# Patient Record
Sex: FEMALE | Race: OTHER | NOT HISPANIC OR LATINO | Employment: FULL TIME | URBAN - METROPOLITAN AREA
[De-identification: names, ages, dates, MRNs, and addresses within clinical notes are randomized per-mention and may not be internally consistent; named-entity substitution may affect disease eponyms.]

---
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
## 2024-04-24 ENCOUNTER — PATIENT MESSAGE (OUTPATIENT)
Dept: OBSTETRICS AND GYNECOLOGY | Facility: CLINIC | Age: 19
End: 2024-04-24
Payer: COMMERCIAL

## 2024-04-30 ENCOUNTER — OFFICE VISIT (OUTPATIENT)
Dept: OBSTETRICS AND GYNECOLOGY | Facility: CLINIC | Age: 19
End: 2024-04-30
Payer: COMMERCIAL

## 2024-04-30 ENCOUNTER — LAB VISIT (OUTPATIENT)
Dept: LAB | Facility: HOSPITAL | Age: 19
End: 2024-04-30
Attending: GENERAL PRACTICE
Payer: COMMERCIAL

## 2024-04-30 VITALS
RESPIRATION RATE: 17 BRPM | DIASTOLIC BLOOD PRESSURE: 62 MMHG | HEIGHT: 62 IN | WEIGHT: 117.75 LBS | BODY MASS INDEX: 21.67 KG/M2 | SYSTOLIC BLOOD PRESSURE: 112 MMHG

## 2024-04-30 DIAGNOSIS — Z11.3 SCREEN FOR STD (SEXUALLY TRANSMITTED DISEASE): Primary | ICD-10-CM

## 2024-04-30 DIAGNOSIS — Z11.3 SCREEN FOR STD (SEXUALLY TRANSMITTED DISEASE): ICD-10-CM

## 2024-04-30 PROCEDURE — 36415 COLL VENOUS BLD VENIPUNCTURE: CPT | Performed by: GENERAL PRACTICE

## 2024-04-30 PROCEDURE — 81514 NFCT DS BV&VAGINITIS DNA ALG: CPT | Performed by: GENERAL PRACTICE

## 2024-04-30 PROCEDURE — 99213 OFFICE O/P EST LOW 20 MIN: CPT | Mod: S$GLB,,, | Performed by: GENERAL PRACTICE

## 2024-04-30 PROCEDURE — 80074 ACUTE HEPATITIS PANEL: CPT | Performed by: GENERAL PRACTICE

## 2024-04-30 PROCEDURE — 86593 SYPHILIS TEST NON-TREP QUANT: CPT | Performed by: GENERAL PRACTICE

## 2024-04-30 PROCEDURE — 99999 PR PBB SHADOW E&M-EST. PATIENT-LVL III: CPT | Mod: PBBFAC,,, | Performed by: GENERAL PRACTICE

## 2024-04-30 PROCEDURE — 87491 CHLMYD TRACH DNA AMP PROBE: CPT | Performed by: GENERAL PRACTICE

## 2024-04-30 PROCEDURE — 87389 HIV-1 AG W/HIV-1&-2 AB AG IA: CPT | Performed by: GENERAL PRACTICE

## 2024-04-30 NOTE — PATIENT INSTRUCTIONS
Have a question or concern?  for an emergency  call 911 or go to the nearest hospital [facility] Nurse Care Advice Line  [phone]  you can speak to a nurse 24-7   for non-urgent issues, send us a  message in [employer] for non-urgent issues, call the clinic  [phone], Option 3   Consider calling the Nurse Care Advice Line if it's a weekend or toward the end of the work-day since [employer] and phone messages may not be answered right away.       STD AND VAGINITIS TESTING:  If you are enrolled in [employer], I will trust that when you see a negative result, you understand that means you do not have the particular STD we were checking for.  You will not get a message from me.    If something comes back positive, one of my staff members or I will call you to let you know about the result and what the recommended treatment is.  For most STD's, it is VERY important that you do not have sex until both you and your partner(s) have completed treatment for the STD.  I cannot prescribe medications for your partner(s).    Bacterial Vaginitis (BV) and vaginal yeast infections (Candida, for example) are not STD's.    Trichomonas is an STD.    If you are prescribed an antibiotic, it is very important that you take all of the medicine as prescribed.  Incomplete treatment can cause a relapse and/or antibiotic resistance.

## 2024-04-30 NOTE — PROGRESS NOTES
SUBJECTIVE  04/30/2024    Antionette Menon is a 18 y.o. here requesting STD screening and checking for a yeast infection.    For the past couple of months feels as though she gets a yeast infection, takes OTC treatment and feels better, but then it comes back.  Primary symptom is itching.  No abnormal discharge.  No odor.  Had stopped taking YASMANI because she worried that might be part of the problem.    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    My full previous note is pasted below.    OBJECTIVE   Vitals:    04/30/24 1127   BP: 112/62   Resp: 17       GEN = alert/oriented, nad, pleasant  HEENT = sclera anicteric, EOM grossly normal   =      External: nefg, no lesions     Vagina: normal and without lesions and urethral meatus normal     Discharge: normal and physiologic, vaginitis swab obtained     Cervix: CT/NG swab obtained and cervix has a smooth ridge of tissue across the top from ~10-2 o'clock     Bimanual: uterus normal size and nontender, no adnexal masses or tenderness      ASSESSMENT / PLAN   18 y.o. requesting STD screen and vaginitis testing.  Normal exam.    f/u results of vaginitis swab, CT/NG, HIV, HEP, TPA Abs    first PAP age 20    Discussed a common side effect of YASMANI is vaginal dryness.  Encouraged use of lubricant if needed.  She plans to resume taking YASMANI for contraception.    RTC lupe WARD MD    -------------------------  6/12/23:    HISTORY OF THE PRESENT ILLNESS    Antionette is a 17 y.o., using Condoms for contraception, here for contraceptive counseling and STD screening.  Her mother is here with her and is supportive.  LMP 17 MAY 2023    -------------------------------------------------------------------------------------------------------    PAST MEDICAL HISTORY  None    PAST SURGICAL HISTORY  GI scope: for reflux, 10th grade    MEDICATIONS  None    ALLERGIES  NKDA    OBSTETRIC HISTORY  G0    GYNECOLOGIC HISTORY  PAP'S: not previously tested  STI'S: no past  "history  GENITAL HSV: no  DYSMENORRHEA: managed well with ibuprofen prn  DYSPAREUNIA: No  PELVIC PRESSURE OR PAIN: No  MENSES:  Menarche: 12 yoa Duration: 5 days   # Heavy Days: 3 How often pad/tampon ? on heavy days: 4 per day   Intermenstrual bleeding: No Frequency: regular every 28-30 days     SOCIAL HISTORY  Smoker: non-smoker  Alcohol: never  Drugs: never  Relationship: one boyfriend x 3 months, one prior sexual partner  Domestic Violence: no  Lives with: mom  Education Level: High School  Occupation: [employer] in [employer]    FAMILY HISTORY  HTN: none  DIABETES: none  BLEEDING DISORDERS: none  CLOTTING DISORDERS: none  GYN CANCER: none    --------------------------------------------------------------------------------------------------------------    PHYSICAL EXAM  Visit Vitals  /60 (BP Location: Right arm, Patient Position: Sitting, BP Method: Medium (Manual))   Resp 16   Ht 5' 2" (1.575 m)   Wt 61.1 kg (134 lb 11.2 oz)   LMP 05/17/2023 (Exact Date)   BMI 24.64 kg/m²        GEN = alert/oriented, nad  HEENT = sclera anicteric, EOM grossly normal      ASSESSMENT AND PLAN:  16yo healthy G0, condoms for contraception, here for contraceptive counseling and STD screening.    -All methods of contraception discussed in detail today, along with their relative pros/cons and side effects.  Discussed barrier methods, OCP's, vaginal rings, skin patches, DepoProvera, Nexplanon, progesterone-containing IUD's, and copper IUD.  Written information provided to the patient, and demonstration performed with models.  The patient would like to start with a combination OCP.  Rx placed for Alesse.  Instructed on Sunday start.  Use condoms reliably until then.    -Discussed implications of becoming sexually active with the patient.  Encouraged her to minimize the number of lifetime partners she has.  Advised just one sexual encounter can result in STD or pregnancy.  Discussed potential immediate and " long-term consequences of STD's: discomfort / pain, social stigma, chronic illness, infertility, emotional effects, HPV related cancers.  Discussed life-changing impact of pregnancy, whether it results in SAB, ETP, or live birth.  Stressed that no contraceptive method is 100% effective.  Stressed that condoms are the only way to prevent STD's.    -Gardasil already received.    -to lab for urine CT/GC and serum: HIV, Hepatitis, RPR    -RTC as needed; may send message in [employer] if would like to switch to a patch    MD ED

## 2024-05-01 DIAGNOSIS — B37.31 VAGINAL CANDIDIASIS: Primary | ICD-10-CM

## 2024-05-01 LAB
BACTERIAL VAGINOSIS DNA: NEGATIVE
C TRACH DNA SPEC QL NAA+PROBE: NOT DETECTED
CANDIDA GLABRATA DNA: NEGATIVE
CANDIDA KRUSEI DNA: NEGATIVE
CANDIDA RRNA VAG QL PROBE: POSITIVE
HAV IGM SERPL QL IA: NEGATIVE
HBV CORE IGM SERPL QL IA: NEGATIVE
HBV SURFACE AG SERPL QL IA: NEGATIVE
HCV IGG SERPL QL IA: NEGATIVE
HIV 1+2 AB+HIV1 P24 AG SERPL QL IA: NEGATIVE
N GONORRHOEA DNA SPEC QL NAA+PROBE: NOT DETECTED
T VAGINALIS RRNA GENITAL QL PROBE: NEGATIVE
TREPONEMA PALLIDUM IGG+IGM AB [PRESENCE] IN SERUM OR PLASMA BY IMMUNOASSAY: NONREACTIVE

## 2024-05-01 RX ORDER — FLUCONAZOLE 150 MG/1
150 TABLET ORAL ONCE
Qty: 2 TABLET | Refills: 0 | Status: SHIPPED | OUTPATIENT
Start: 2024-05-01 | End: 2024-05-01

## 2024-05-01 NOTE — PROGRESS NOTES
Antionette,    Your test shows you have a vaginal yeast infection.  I've ordered a medication called Diflucan for you to take.  It's a pill that you'll take one time.  If 3 days later you're still having symptoms (itching, discharge, burning), then you should take the 2nd pill.  If you don't need the 2nd pill you can save it for use another time or discard it.    The script went to the [facility] in [place].  Let me know if you need it to go somewhere else.    Sincerely,  Dr. Draper

## 2024-05-23 ENCOUNTER — PATIENT MESSAGE (OUTPATIENT)
Dept: OBSTETRICS AND GYNECOLOGY | Facility: CLINIC | Age: 19
End: 2024-05-23
Payer: COMMERCIAL

## 2024-05-23 DIAGNOSIS — B37.31 VAGINAL CANDIDIASIS: Primary | ICD-10-CM

## 2024-05-23 RX ORDER — LEVONORGESTREL AND ETHINYL ESTRADIOL 0.1-0.02MG
1 KIT ORAL
Qty: 84 TABLET | Refills: 2 | OUTPATIENT
Start: 2024-05-23

## 2024-05-23 RX ORDER — ASPIRIN 325 MG
1 TABLET, DELAYED RELEASE (ENTERIC COATED) ORAL NIGHTLY
Qty: 45 G | Refills: 0 | Status: SHIPPED | OUTPATIENT
Start: 2024-05-23

## 2024-06-26 DIAGNOSIS — N94.6 MENSTRUAL CRAMPS: ICD-10-CM

## 2024-06-27 RX ORDER — IBUPROFEN 600 MG/1
600 TABLET ORAL 2 TIMES DAILY PRN
Qty: 30 TABLET | Refills: 0 | Status: SHIPPED | OUTPATIENT
Start: 2024-06-27

## 2024-07-10 ENCOUNTER — TELEPHONE (OUTPATIENT)
Dept: FAMILY MEDICINE | Facility: CLINIC | Age: 19
End: 2024-07-10
Payer: COMMERCIAL

## 2024-12-17 ENCOUNTER — OFFICE VISIT (OUTPATIENT)
Dept: OBSTETRICS AND GYNECOLOGY | Facility: CLINIC | Age: 19
End: 2024-12-17
Payer: COMMERCIAL

## 2024-12-17 VITALS
OXYGEN SATURATION: 99 % | HEART RATE: 69 BPM | HEIGHT: 62 IN | SYSTOLIC BLOOD PRESSURE: 110 MMHG | DIASTOLIC BLOOD PRESSURE: 68 MMHG | WEIGHT: 122.13 LBS | BODY MASS INDEX: 22.48 KG/M2

## 2024-12-17 DIAGNOSIS — Z01.419 WELL WOMAN EXAM WITH ROUTINE GYNECOLOGICAL EXAM: Primary | ICD-10-CM

## 2024-12-17 DIAGNOSIS — B37.31 VAGINAL YEAST INFECTION: ICD-10-CM

## 2024-12-17 DIAGNOSIS — Z11.3 SCREEN FOR STD (SEXUALLY TRANSMITTED DISEASE): ICD-10-CM

## 2024-12-17 PROCEDURE — 0352U VAGINOSIS SCREEN BY DNA PROBE: CPT | Performed by: FAMILY MEDICINE

## 2024-12-17 PROCEDURE — 87491 CHLMYD TRACH DNA AMP PROBE: CPT | Performed by: FAMILY MEDICINE

## 2024-12-17 PROCEDURE — 99999 PR PBB SHADOW E&M-EST. PATIENT-LVL III: CPT | Mod: PBBFAC,,, | Performed by: FAMILY MEDICINE

## 2024-12-17 PROCEDURE — 99395 PREV VISIT EST AGE 18-39: CPT | Mod: S$GLB,,, | Performed by: FAMILY MEDICINE

## 2024-12-17 RX ORDER — FLUCONAZOLE 150 MG/1
150 TABLET ORAL
Qty: 3 TABLET | Refills: 0 | Status: SHIPPED | OUTPATIENT
Start: 2024-12-17

## 2024-12-17 RX ORDER — LEVONORGESTREL/ETHIN.ESTRADIOL 0.1-0.02MG
1 TABLET ORAL DAILY
Qty: 90 TABLET | Refills: 3 | Status: CANCELLED | OUTPATIENT
Start: 2024-12-17

## 2024-12-17 NOTE — PROGRESS NOTES
"  HISTORY OF THE PRESENT ILLNESS    12/17/2024  Antionette Menon is a 19 y.o. here for Annual Exam  Reports doing well and requesting STD screening today.   Does report thick white vaginal discharge that is recurrent.     G'sP's: No obstetric history on file.  LMP: Patient's last menstrual period was 11/25/2024 (exact date).  Relationship: boyfriend for almost 2 years  Contraception: condoms    PAP'S: not previously tested    HPV Vaccine: complete     LABS & RADS   Lab Results   Component Value Date    WBC 4.97 07/12/2023    HGB 10.4 (L) 07/12/2023    HCT 35.1 (L) 07/12/2023     07/12/2023    MCV 75 (L) 07/12/2023      Lab Results   Component Value Date    TSH 1.273 07/12/2023      No results found for: "LABURIN"  Lab Results   Component Value Date    HEPCAB Negative 04/30/2024    TREPABIGMIGG Nonreactive 04/30/2024    HEPBSAG Negative 04/30/2024    HIH60DGRH Negative 04/30/2024     Lab Results   Component Value Date    LABCHLA Not Detected 04/30/2024    LABNGO Not Detected 04/30/2024        GYNECOLOGIC HISTORY    Genital HSV: no  STD Hx: no past history    Menarche:    Period duration: 4 days  # Heavy Days: 2  Pad/tampon ? on heavy days: couple hours  Intermenstrual bleeding: No  Period frequency:regular every 28-30 days    OBSTETRIC HISTORY  OB History   No obstetric history on file.       PAST MEDICAL HISTORY  -------------------------------------    H. pylori infection         PAST SURGICAL HISTORY  ----------------------------    Egd - external result         ALLERGIES  Review of patient's allergies indicates:  No Known Allergies    MEDICATIONS  Current Outpatient Medications   Medication Instructions    azelastine (ASTELIN) 137 mcg, Nasal, 2 times daily    clotrimazole (LOTRIMIN) 1 % Crea 1 applicator, Vaginal, Nightly    ibuprofen (ADVIL,MOTRIN) 600 mg, Oral, 2 times daily PRN       SOCIAL HISTORY  Social History     Tobacco Use    Smoking status: Never    Smokeless tobacco: Never   Substance Use Topics " "   Alcohol use: Never    Drug use: Never      Lives with: mom  Occupation:  surgical tech school      FAMILY HISTORY  BLEEDING or  CLOTTING DISORDERS: none  BREAST CA: none  UTERINE CA: none  OVARIAN CA: none  COLON CA: none    REVIEW OF SYSTEMS  Review of Systems   Constitutional:  Negative for activity change, appetite change and fever.   Respiratory:  Negative for cough and shortness of breath.    Genitourinary:  Negative for dysuria, flank pain, frequency, pelvic pain, urgency and urinary incontinence.   Psychiatric/Behavioral:  Negative for depression.      --------------------------------------------------------------------------------------------------------------    PHYSICAL EXAM  VITALS:  Vitals:    12/17/24 1015   BP: 110/68   BP Location: Left arm   Patient Position: Sitting   Pulse: 69   SpO2: 99%   Weight: 55.4 kg (122 lb 2.2 oz)   Height: 5' 2" (1.575 m)     Exam conducted with a chaperone present.     Physical Exam:   Constitutional: She is oriented to person, place, and time. She appears well-developed and well-nourished.    HENT:   Head: Normocephalic.    Eyes: Pupils are equal, round, and reactive to light. Conjunctivae and EOM are normal.      Pulmonary/Chest: Effort normal.          Genitourinary:    Inguinal canal, vagina, right adnexa, left adnexa and rectum normal.      Pelvic exam was performed with patient supine.   The external female genitalia was normal.   Genitalia hair distrobution normal .   Vagina was moist.          Musculoskeletal: Normal range of motion and moves all extremeties.       Neurological: She is alert and oriented to person, place, and time.    Skin: Skin is warm and dry.    Psychiatric: She has a normal mood and affect.          ASSESSMENT AND PLAN:  Antionette Menon 19 y.o.   Antionette was seen today for annual exam.    Diagnoses and all orders for this visit:    Well woman exam with routine gynecological exam    Screen for STD (sexually transmitted disease)  -     " Vaginosis Screen by DNA Probe  -     C. trachomatis/N. gonorrhoeae by AMP DNA [facility]; Cervix    Vaginal yeast infection  -     fluconazole (DIFLUCAN) 150 MG Tab; Take 1 tablet (150 mg total) by mouth every 72 hours as needed (vaginal yeast).      Cervical Cancer screening: first PAP age 20  HPV Vaccine: complete    Patient was counseled today on :  Pap guidelines  Recommend periodic pelvic exams with visual inspection and palpation  mammograms starting annually at age 40  Encouraged self breast awareness; RTC for breast concerns  Colonoscopy after the age of 50  Dexa Bone Scan and calcium and vitamin D supplementation in menopause and to see her PCP for other health maintenance.     RTC for periodic GYN exam, sooner prn      Korin T Griffing     Follow up if symptoms worsen or fail to improve.   No future appointments.      All of your core healthy metrics are met.

## 2024-12-18 DIAGNOSIS — N94.6 MENSTRUAL CRAMPS: ICD-10-CM

## 2024-12-18 RX ORDER — IBUPROFEN 600 MG/1
600 TABLET ORAL 2 TIMES DAILY PRN
Qty: 30 TABLET | Refills: 0 | OUTPATIENT
Start: 2024-12-18

## 2024-12-20 DIAGNOSIS — B96.89 BV (BACTERIAL VAGINOSIS): Primary | ICD-10-CM

## 2024-12-20 DIAGNOSIS — N76.0 BV (BACTERIAL VAGINOSIS): Primary | ICD-10-CM

## 2024-12-20 LAB
BACTERIAL VAGINOSIS DNA: DETECTED
C TRACH DNA SPEC QL NAA+PROBE: NOT DETECTED
CANDIDA GLABRATA/KRUSEI: NOT DETECTED
CANDIDA RRNA VAG QL PROBE: DETECTED
N GONORRHOEA DNA SPEC QL NAA+PROBE: NOT DETECTED
TRICHOMONAS VAGINALIS: NOT DETECTED

## 2024-12-20 RX ORDER — METRONIDAZOLE 7.5 MG/G
1 GEL VAGINAL NIGHTLY
Qty: 70 G | Refills: 0 | Status: SHIPPED | OUTPATIENT
Start: 2024-12-20 | End: 2024-12-25

## 2024-12-20 NOTE — PROGRESS NOTES
your test shows you do have bacterial vaginitis (BV) as well as the yeast infection.  I have ordered metrogel (metrodiniazole) vaginal gel for you.   this is not an STD but you should refrain from having sex until treatment is completed.

## 2025-01-24 ENCOUNTER — PATIENT MESSAGE (OUTPATIENT)
Dept: OBSTETRICS AND GYNECOLOGY | Facility: CLINIC | Age: 20
End: 2025-01-24
Payer: COMMERCIAL

## 2025-01-24 RX ORDER — METRONIDAZOLE 7.5 MG/G
1 GEL VAGINAL
Qty: 70 G | Refills: 0 | Status: SHIPPED | OUTPATIENT
Start: 2025-01-24

## 2025-02-26 ENCOUNTER — OFFICE VISIT (OUTPATIENT)
Dept: FAMILY MEDICINE | Facility: CLINIC | Age: 20
End: 2025-02-26
Payer: COMMERCIAL

## 2025-02-26 ENCOUNTER — LAB VISIT (OUTPATIENT)
Dept: LAB | Facility: HOSPITAL | Age: 20
End: 2025-02-26
Attending: NURSE PRACTITIONER
Payer: COMMERCIAL

## 2025-02-26 VITALS
WEIGHT: 120.94 LBS | HEART RATE: 68 BPM | OXYGEN SATURATION: 100 % | DIASTOLIC BLOOD PRESSURE: 66 MMHG | BODY MASS INDEX: 22.12 KG/M2 | SYSTOLIC BLOOD PRESSURE: 112 MMHG

## 2025-02-26 DIAGNOSIS — L65.9 HAIR THINNING: ICD-10-CM

## 2025-02-26 DIAGNOSIS — Z00.00 ANNUAL PHYSICAL EXAM: ICD-10-CM

## 2025-02-26 DIAGNOSIS — Z00.00 ANNUAL PHYSICAL EXAM: Primary | ICD-10-CM

## 2025-02-26 LAB
25(OH)D3+25(OH)D2 SERPL-MCNC: 8 NG/ML (ref 30–96)
ALBUMIN SERPL BCP-MCNC: 4 G/DL (ref 3.5–5.2)
ALP SERPL-CCNC: 77 U/L (ref 40–150)
ALT SERPL W/O P-5'-P-CCNC: 9 U/L (ref 10–44)
ANION GAP SERPL CALC-SCNC: 7 MMOL/L (ref 8–16)
AST SERPL-CCNC: 26 U/L (ref 10–40)
BASOPHILS # BLD AUTO: 0.03 K/UL (ref 0–0.2)
BASOPHILS NFR BLD: 0.7 % (ref 0–1.9)
BILIRUB SERPL-MCNC: 0.5 MG/DL (ref 0.1–1)
BUN SERPL-MCNC: 9 MG/DL (ref 6–20)
CALCIUM SERPL-MCNC: 9.1 MG/DL (ref 8.7–10.5)
CHLORIDE SERPL-SCNC: 108 MMOL/L (ref 95–110)
CO2 SERPL-SCNC: 25 MMOL/L (ref 23–29)
CREAT SERPL-MCNC: 0.8 MG/DL (ref 0.5–1.4)
DIFFERENTIAL METHOD BLD: ABNORMAL
EOSINOPHIL # BLD AUTO: 0.1 K/UL (ref 0–0.5)
EOSINOPHIL NFR BLD: 2.7 % (ref 0–8)
ERYTHROCYTE [DISTWIDTH] IN BLOOD BY AUTOMATED COUNT: 15 % (ref 11.5–14.5)
EST. GFR  (NO RACE VARIABLE): >60 ML/MIN/1.73 M^2
GLUCOSE SERPL-MCNC: 87 MG/DL (ref 70–110)
HCT VFR BLD AUTO: 38.8 % (ref 37–48.5)
HGB BLD-MCNC: 12.2 G/DL (ref 12–16)
IMM GRANULOCYTES # BLD AUTO: 0.01 K/UL (ref 0–0.04)
IMM GRANULOCYTES NFR BLD AUTO: 0.2 % (ref 0–0.5)
LYMPHOCYTES # BLD AUTO: 2 K/UL (ref 1–4.8)
LYMPHOCYTES NFR BLD: 44.8 % (ref 18–48)
MCH RBC QN AUTO: 26.5 PG (ref 27–31)
MCHC RBC AUTO-ENTMCNC: 31.4 G/DL (ref 32–36)
MCV RBC AUTO: 84 FL (ref 82–98)
MONOCYTES # BLD AUTO: 0.3 K/UL (ref 0.3–1)
MONOCYTES NFR BLD: 7.6 % (ref 4–15)
NEUTROPHILS # BLD AUTO: 2 K/UL (ref 1.8–7.7)
NEUTROPHILS NFR BLD: 44 % (ref 38–73)
NRBC BLD-RTO: 0 /100 WBC
PLATELET # BLD AUTO: 354 K/UL (ref 150–450)
PMV BLD AUTO: 10.8 FL (ref 9.2–12.9)
POTASSIUM SERPL-SCNC: 4.4 MMOL/L (ref 3.5–5.1)
PROT SERPL-MCNC: 7.3 G/DL (ref 6–8.4)
RBC # BLD AUTO: 4.61 M/UL (ref 4–5.4)
SODIUM SERPL-SCNC: 140 MMOL/L (ref 136–145)
TSH SERPL DL<=0.005 MIU/L-ACNC: 3.42 UIU/ML (ref 0.4–4)
WBC # BLD AUTO: 4.49 K/UL (ref 3.9–12.7)

## 2025-02-26 PROCEDURE — 99395 PREV VISIT EST AGE 18-39: CPT | Mod: S$GLB,,, | Performed by: NURSE PRACTITIONER

## 2025-02-26 PROCEDURE — 99999 PR PBB SHADOW E&M-EST. PATIENT-LVL III: CPT | Mod: PBBFAC,,, | Performed by: NURSE PRACTITIONER

## 2025-02-26 PROCEDURE — 36415 COLL VENOUS BLD VENIPUNCTURE: CPT | Mod: PO | Performed by: NURSE PRACTITIONER

## 2025-02-26 PROCEDURE — 85025 COMPLETE CBC W/AUTO DIFF WBC: CPT | Performed by: NURSE PRACTITIONER

## 2025-02-26 PROCEDURE — 84443 ASSAY THYROID STIM HORMONE: CPT | Performed by: NURSE PRACTITIONER

## 2025-02-26 PROCEDURE — 80053 COMPREHEN METABOLIC PANEL: CPT | Performed by: NURSE PRACTITIONER

## 2025-02-26 PROCEDURE — 82306 VITAMIN D 25 HYDROXY: CPT | Performed by: NURSE PRACTITIONER

## 2025-02-26 NOTE — PROGRESS NOTES
Patient ID: Antionette Menon is a 19 y.o. female.    Chief Complaint: Annual Exam  Last seen in primary care by me on 07/12/2023  She is accompanied by her boyfriend    History of Present Illness    CHIEF COMPLAINT:  Patient presents today for annual evaluation with multiple concerns including headaches.    HEADACHES:  She reports headaches that begin mild in the morning and progressively worsen throughout the day. The location varies and is described as a tight sensation rather than pounding, with associated nausea. Current episode has persisted for three days, which is longer than her typical duration. She denies any neurological changes in sight, ability to manage across the day. States it does not effect her daily activities    DIET AND HYDRATION:  She reports frequent consumption of fast food, particularly daily [employer]. She acknowledges poor water intake with minimal daily consumption. She experiences bloating after eating.    GI CONCERNS:  She reports recent constipation and difficulty swallowing, which she attributes to accidentally swallowing larger pieces of food due to her braces.    HAIR LOSS:  She reports significant hair loss with increased shedding during washing and combing, despite having long hair.    REPRODUCTIVE HEALTH:  She has regular 28-day menstrual cycles and recently completed her period. She is sexually active and uses condoms consistently for contraception. She reports a previous negative experience with birth control.         Physical Exam    General: No acute distress. Well-developed. Well-nourished.  Eyes: EOMI. Sclerae anicteric.  HENT: Normocephalic. Atraumatic. Nares patent. Moist oral mucosa.  Ears: Bilateral TMs clear. Bilateral EACs clear.  Cardiovascular: Regular rate. Regular rhythm. No murmurs. No rubs. No gallops. Normal S1, S2. Normal heart sounds.  Respiratory: Normal respiratory effort. Clear to auscultation bilaterally. No rales. No rhonchi. No wheezing.  Abdomen: Soft.  Non-tender. Non-distended. Normoactive bowel sounds.  Musculoskeletal: No  obvious deformity.  Extremities: No lower extremity edema.  Neurological: Alert & oriented x3. No slurred speech. Normal gait.  Psychiatric: Normal mood. Normal affect. Good insight. Good judgment.  Skin: Warm. Dry. No rash.         Assessment & Plan    IMPRESSION:  - Evaluated reported headaches, considering potential causes such as menstrual cycle, hairstyle, and dehydration  - Assessed for anemia and thyroid function due to reported hair loss  - Considered vitamin D deficiency given patient's ethnicity  - Evaluated GI symptoms, including bloating and constipation, likely related to poor diet and eating habits    HEADACHES:  - Advised the patient to use a headache tracking john to monitor frequency and potential triggers.  - Recommend loosening or removing hair ties at night to reduce tension headaches.  - Noted that the patient experiences mild headaches in the morning that worsen throughout the day, lasting for 3 days.  - Evaluated the headache characteristics, including location, duration, and associated symptoms.  - Assessed potential triggers for the headaches, including hairstyle and stress levels.  - Suggested increasing water intake as a potential remedy for the headaches.  - Instructed the patient to report if headaches persist or worsen despite implemented changes.    DYSPHAGIA:  - Advised the patient to chew food thoroughly to aid digestion and prevent choking.  - Noted that the patient reports feeling like they are choking on food, especially when swallowing larger pieces due to braces.  - Recommend reducing water intake while eating and to eat more slowly while chewing more during meals and avoiding sodas.  - Instructed the patient to follow up if dysphagia symptoms persist after implementing dietary changes.    GASTROINTESTINAL ISSUES:  - Noted that the patient reports experiencing constipation and feeling bloated after  eating.  - Evaluated the patient's diet, noting frequent consumption of fast food and junk food.  - Performed a physical exam and noted good bowel sounds with no pain.  - Discussed the importance of a balanced diet and the risks associated with frequent consumption of processed and fast foods.  - Recommend reducing fast food consumption to 3 days a week and increasing intake of fruits, vegetables, and fiber-rich foods.  - Instructed the patient to follow up if GI symptoms persist after implementing dietary changes.    HAIR LOSS:  - Noted that the patient reports experiencing hair loss and shedding, particularly when combing or washing hair.  - Acknowledged that some hair loss is normal but planned to examine the patient's hair more closely.  - Planned to check thyroid function and vitamin D levels as potential causes of hair loss but explained often it can also be hair products and she carefully chose items that are less toxic    DIETARY COUNSELING:  - Emphasized the importance of proper nutrition and its impact on overall health, particularly regarding colon cancer risk.  - Discussed the benefits of eating a variety of fruits and vegetables for improved bowel function.  - Recommend reducing eating out to 3 days per week.  - Advised incorporating daily salads and fruits into diet.  - Encouraged consuming a variety of colorful fruits and vegetables.  - Recommend limiting soda consumption, including diet sodas.  - Noted that the patient reports eating a lot of junk food and fast food, particularly [employer], [employer], and [employer] almost daily.  - Evaluated the patient's diet and noted the frequent consumption of fast food and processed foods.  - Discussed the risks associated with frequent consumption of processed and fast foods, including increased risk of colon cancer.  - Advised increasing intake of fruits and vegetables and incorporating more home-cooked meals.    PREVENTIVE CARE:  - Educated on various birth  control options, including condoms, hormonal methods, and emergency contraception. Attached handout to after visit summary for birth control options.  - Explained the potential risks of smoking, excessive alcohol consumption, and illicit drug use.  - Discussed the importance of seat belt use for safety.  - Increase water intake.  - Maintain non-smoking status.    LABS:  - Annual labs ordered including complete blood count, comprehensive metabolic panel, and thyroid function tests.  - Vitamin D level test ordered.          Medication List with Changes/Refills   Current Medications    AZELASTINE (ASTELIN) 137 MCG (0.1 %) NASAL SPRAY    1 spray (137 mcg total) by Nasal route 2 (two) times daily.   Discontinued Medications    CLOTRIMAZOLE (LOTRIMIN) 1 % CREA    Place 1 suppository (1 applicator total) vaginally every evening.    FLUCONAZOLE (DIFLUCAN) 150 MG TAB    Take 1 tablet (150 mg total) by mouth every 72 hours as needed (vaginal yeast).    IBUPROFEN (ADVIL,MOTRIN) 600 MG TABLET    Take 1 tablet (600 mg total) by mouth 2 (two) times daily as needed for Pain.    METRONIDAZOLE (METROGEL) 0.75 % (37.5MG/5 GRAM) VAGINAL GEL    PLACE 1 APPLICATOR VAGINALLY EVERY EVENING. FOR 5 DAYS        No follow-ups on file.    This note was generated with the assistance of ambient listening technology. Verbal consent was obtained by the patient and accompanying visitor(s) for the recording of patient appointment to facilitate this note. I attest to having reviewed and edited the generated note for accuracy, though some syntax or spelling errors may persist. Please contact the author of this note for any clarification.

## 2025-04-04 ENCOUNTER — RESULTS FOLLOW-UP (OUTPATIENT)
Dept: FAMILY MEDICINE | Facility: CLINIC | Age: 20
End: 2025-04-04

## 2025-04-04 DIAGNOSIS — E55.9 VITAMIN D DEFICIENCY: Primary | ICD-10-CM

## 2025-04-04 RX ORDER — ERGOCALCIFEROL 1.25 MG/1
50000 CAPSULE ORAL
Qty: 12 CAPSULE | Refills: 3 | Status: SHIPPED | OUTPATIENT
Start: 2025-04-04

## 2025-06-02 ENCOUNTER — OFFICE VISIT (OUTPATIENT)
Dept: PRIMARY CARE CLINIC | Facility: CLINIC | Age: 20
End: 2025-06-02
Payer: COMMERCIAL

## 2025-06-02 DIAGNOSIS — K21.9 GASTRIC REFLUX: Primary | ICD-10-CM

## 2025-06-02 PROCEDURE — 98004 SYNCH AUDIO-VIDEO EST SF 10: CPT | Mod: 95,,, | Performed by: NURSE PRACTITIONER

## 2025-06-02 RX ORDER — PANTOPRAZOLE SODIUM 40 MG/1
40 TABLET, DELAYED RELEASE ORAL DAILY
Qty: 30 TABLET | Refills: 1 | Status: SHIPPED | OUTPATIENT
Start: 2025-06-02 | End: 2026-06-02

## 2025-06-02 NOTE — PROGRESS NOTES
"The patient location is: [place]  The chief complaint leading to consultation is: chronic abdominal discomfort  History H-pylori  States had upper GI a few years ago  I last saw her on 02/26/2025 for annual  "Food sometimes feel is in throat, states feel like a bunch of mucous"  "Burping a thousand times daily"  BM every few days but ocassionally may go once every 4 days  Started Depo shot May 2025    Visit type: audiovisual    Face to Face time with patient: 10:03-10:15  12 minutes of total time spent on the encounter, which includes face to face time and non-face to face time preparing to see the patient (eg, review of tests), Obtaining and/or reviewing separately obtained history, Documenting clinical information in the electronic or other health record, Independently interpreting results (not separately reported) and communicating results to the patient/family/caregiver, or Care coordination (not separately reported).         Each patient to whom he or she provides medical services by telemedicine is:  (1) informed of the relationship between the physician and patient and the respective role of any other health care provider with respect to management of the patient; and (2) notified that he or she may decline to receive medical services by telemedicine and may withdraw from such care at any time.    Notes:   No blood in stool  No nausea or vomiting  Advised on need for in person assessment with myself or another provider with nature of her complaint  Discussed on need to go directly to ED of pain worsens, inability to eat or severe nausea and vomiting before in person visit    Medication List with Changes/Refills   New Medications    PANTOPRAZOLE (PROTONIX) 40 MG TABLET    Take 1 tablet (40 mg total) by mouth once daily.   Current Medications    ERGOCALCIFEROL (ERGOCALCIFEROL) 50,000 UNIT CAP    Take 1 capsule (50,000 Units total) by mouth every 7 days.   Discontinued Medications    AZELASTINE (ASTELIN) 137 " MCG (0.1 %) NASAL SPRAY    1 spray (137 mcg total) by Nasal route 2 (two) times daily.                          Answers submitted by the patient for this visit:  Abdominal Pain Questionnaire (Submitted on 6/2/2025)  Chief Complaint: Abdominal pain  Chronicity: recurrent  Onset: more than 1 year ago  Onset quality: gradual  Frequency: intermittently  Progression since onset: waxing and waning  Pain location: epigastric region  Pain - numeric: 5/10  Pain quality: aching, cramping, sharp  anorexia: No  arthralgias: No  belching: Yes  constipation: Yes  diarrhea: No  dysuria: No  fever: No  flatus: Yes  frequency: No  headaches: No  hematochezia: No  hematuria: No  melena: No  myalgias: No  nausea: No  weight loss: No  vomiting: No  Aggravated by: drinking alcohol  Relieved by: certain positions  Pain severity: mild  Treatments tried: antibiotics  Improvement on treatment: significant  abdominal surgery: No  colon cancer: No  Crohn's disease: No  gallstones: No  GERD: Yes  irritable bowel syndrome: No  kidney stones: No  pancreatitis: No  PUD: Yes  ulcerative colitis: No  UTI: No

## 2025-06-10 ENCOUNTER — OFFICE VISIT (OUTPATIENT)
Dept: GASTROENTEROLOGY | Facility: CLINIC | Age: 20
End: 2025-06-10
Payer: COMMERCIAL

## 2025-06-10 ENCOUNTER — OFFICE VISIT (OUTPATIENT)
Dept: FAMILY MEDICINE | Facility: CLINIC | Age: 20
End: 2025-06-10
Payer: COMMERCIAL

## 2025-06-10 VITALS
BODY MASS INDEX: 22.58 KG/M2 | WEIGHT: 123.44 LBS | HEART RATE: 84 BPM | DIASTOLIC BLOOD PRESSURE: 64 MMHG | SYSTOLIC BLOOD PRESSURE: 110 MMHG | OXYGEN SATURATION: 99 % | RESPIRATION RATE: 18 BRPM

## 2025-06-10 VITALS — WEIGHT: 123.44 LBS | HEIGHT: 62 IN | BODY MASS INDEX: 22.71 KG/M2

## 2025-06-10 DIAGNOSIS — R10.9 INTERMITTENT ABDOMINAL PAIN: ICD-10-CM

## 2025-06-10 DIAGNOSIS — R14.2 BURPING: Primary | ICD-10-CM

## 2025-06-10 DIAGNOSIS — Z86.19 HISTORY OF HELICOBACTER PYLORI INFECTION: ICD-10-CM

## 2025-06-10 DIAGNOSIS — R10.13 EPIGASTRIC PAIN: Primary | ICD-10-CM

## 2025-06-10 DIAGNOSIS — R14.2 BELCHING: ICD-10-CM

## 2025-06-10 DIAGNOSIS — K21.9 GASTROESOPHAGEAL REFLUX DISEASE WITHOUT ESOPHAGITIS: ICD-10-CM

## 2025-06-10 PROCEDURE — 99214 OFFICE O/P EST MOD 30 MIN: CPT | Mod: S$GLB,,, | Performed by: STUDENT IN AN ORGANIZED HEALTH CARE EDUCATION/TRAINING PROGRAM

## 2025-06-10 PROCEDURE — 99999 PR PBB SHADOW E&M-EST. PATIENT-LVL III: CPT | Mod: PBBFAC,,, | Performed by: STUDENT IN AN ORGANIZED HEALTH CARE EDUCATION/TRAINING PROGRAM

## 2025-06-10 PROCEDURE — 99999 PR PBB SHADOW E&M-EST. PATIENT-LVL III: CPT | Mod: PBBFAC,,, | Performed by: NURSE PRACTITIONER

## 2025-06-10 PROCEDURE — 99204 OFFICE O/P NEW MOD 45 MIN: CPT | Mod: S$GLB,,, | Performed by: NURSE PRACTITIONER

## 2025-06-10 NOTE — PROGRESS NOTES
Problem visit/UC  Chief Complaint   Patient presents with    Abdominal Pain     Started 4 years ago off and on        Subjective   Patient ID: Antionette Menon is a 19 y.o. female.    HPI  Antionette Menon is a 19 y.o. who presents with abdominal pain.   Patient is being seen today for same day sick visit. Patient is new to provider. PCP is ROSE Henriquez.    The patient reports for the last 4yrs she has been having abdominal pain. She states at that time she was dx with H pylori vai EGD but she did not complete the treatment as prescribed. She reports for hte last 1.5yrs her symptoms have gotten worse again. She saw her PCP 1 week ago and she was prescirbed protonix which the patient states it has helped.      Review of Systems  Objective      Vitals:    06/10/25 1042   BP: 110/64   Patient Position: Sitting   Pulse: 84   Resp: 18   SpO2: 99%   Weight: 56 kg (123 lb 7.3 oz)       Physical Exam  Vitals reviewed.   Constitutional:       General: She is not in acute distress.     Appearance: Normal appearance. She is not ill-appearing, toxic-appearing or diaphoretic.   HENT:      Head: Normocephalic and atraumatic.   Pulmonary:      Effort: No respiratory distress.   Abdominal:      General: Bowel sounds are normal. There is no distension.      Palpations: Abdomen is soft. There is no mass.      Tenderness: There is no abdominal tenderness. There is no guarding or rebound.      Hernia: No hernia is present.   Neurological:      Mental Status: She is alert.         Procedures         Assessment & Plan   Burping  - The patient reports she has noticed increased burping and reflex for which she saw her PCP last week and was prescribed protonix. She states there has been improvement of her reflux but the burping has continued   -     Ambulatory referral/consult to Gastroenterology; Future; Expected date: 06/17/2025    Intermittent abdominal pain  - The patient states that she has had intermittent abdominal pain for the last 4yrs  but it has worsened in the last 1.5yrs. no acute findings on physical exam  -     Ambulatory referral/consult to Gastroenterology; Future; Expected date: 06/17/2025    History of Helicobacter pylori infection  - The patient reports previous dx with h pylori 4yrs ago by EGD but she did not finish her abx treatment when prescribed  - Unable to do breath h pylori test as patient started protonix prescribed by her PCP 1 week ago  -     Ambulatory referral/consult to Gastroenterology; Future; Expected date: 06/17/2025          Patient given return precautions and instructed to FU with PCP regarding symptoms.

## 2025-06-10 NOTE — PROGRESS NOTES
Subjective:       Patient ID: Antionette Menon is a 19 y.o. female, Body mass index is 22.58 kg/m².    Chief Complaint: Abdominal Pain      Patient is new to me. Referred by Dr. Irwin for burping, intermittent abdominal pain, and history of Helicobacter pylori infection.    Gastroesophageal Reflux  She complains of abdominal pain (epigastric region; intermittent), belching, heartburn and water brash. She reports no chest pain, no choking, no coughing, no dysphagia, no early satiety, no globus sensation, no hoarse voice, no nausea, no sore throat, no stridor, no tooth decay or no wheezing. Primary symptoms also include regurgitation of food. This is a recurrent problem. The current episode started more than 1 month ago (Recurred in 1/2025). The problem occurs frequently. The problem has been gradually improving (since starting Protonix). The heartburn is located in the substernum and abdomen. The heartburn is of moderate intensity. The heartburn does not wake her from sleep. The heartburn does not limit her activity. The heartburn doesn't change with position. The symptoms are aggravated by ETOH. Pertinent negatives include no anemia, melena or weight loss. Risk factors include ETOH use (Occ.). She has tried a PPI (Currently: Protonix 40 mg once daily- helps) for the symptoms. Past procedures include an EGD.     Review of Systems   Constitutional:  Negative for appetite change, fever, unexpected weight change and weight loss.   HENT:  Negative for hoarse voice, sore throat and trouble swallowing.    Respiratory:  Negative for cough, choking, shortness of breath and wheezing.    Cardiovascular:  Negative for chest pain.   Gastrointestinal:  Positive for abdominal pain (epigastric region; intermittent) and heartburn. Negative for abdominal distention, anal bleeding, blood in stool, constipation, diarrhea, dysphagia, melena, nausea, rectal pain and vomiting.   Genitourinary:  Negative for difficulty urinating and dysuria.    Musculoskeletal:  Negative for gait problem.   Skin:  Negative for rash.   Neurological:  Negative for speech difficulty.   Psychiatric/Behavioral:  Negative for confusion.        Past Medical History:   Diagnosis Date    H. pylori infection       Past Surgical History:   Procedure Laterality Date    EGD - EXTERNAL RESULT      UPPER GASTROINTESTINAL ENDOSCOPY        No family history on file.   Wt Readings from Last 10 Encounters:   06/10/25 56 kg (123 lb 7.3 oz)   06/10/25 56 kg (123 lb 7.3 oz)   02/26/25 54.9 kg (120 lb 14.8 oz)   12/17/24 55.4 kg (122 lb 2.2 oz)   04/30/24 53.4 kg (117 lb 11.6 oz) (30%, Z= -0.52)*   11/12/23 59.1 kg (130 lb 4.7 oz) (57%, Z= 0.17)*   07/12/23 58.9 kg (129 lb 13.6 oz) (58%, Z= 0.19)*   06/12/23 61.1 kg (134 lb 11.2 oz) (66%, Z= 0.41)*     * Growth percentiles are based on CDC (Girls, 2-20 Years) data.     Lab Results   Component Value Date    WBC 4.49 02/26/2025    HGB 12.2 02/26/2025    HCT 38.8 02/26/2025    MCV 84 02/26/2025     02/26/2025     CMP  Sodium   Date Value Ref Range Status   02/26/2025 140 136 - 145 mmol/L Final     Potassium   Date Value Ref Range Status   02/26/2025 4.4 3.5 - 5.1 mmol/L Final     Chloride   Date Value Ref Range Status   02/26/2025 108 95 - 110 mmol/L Final     CO2   Date Value Ref Range Status   02/26/2025 25 23 - 29 mmol/L Final     Glucose   Date Value Ref Range Status   02/26/2025 87 70 - 110 mg/dL Final     BUN   Date Value Ref Range Status   02/26/2025 9 6 - 20 mg/dL Final     Creatinine   Date Value Ref Range Status   02/26/2025 0.8 0.5 - 1.4 mg/dL Final     Calcium   Date Value Ref Range Status   02/26/2025 9.1 8.7 - 10.5 mg/dL Final     Total Protein   Date Value Ref Range Status   02/26/2025 7.3 6.0 - 8.4 g/dL Final     Albumin   Date Value Ref Range Status   02/26/2025 4.0 3.5 - 5.2 g/dL Final     Total Bilirubin   Date Value Ref Range Status   02/26/2025 0.5 0.1 - 1.0 mg/dL Final     Comment:     For infants and newborns,  interpretation of results should be based  on gestational age, weight and in agreement with clinical  observations.    Premature Infant recommended reference ranges:  Up to 24 hours.............<8.0 mg/dL  Up to 48 hours............<12.0 mg/dL  3-5 days..................<15.0 mg/dL  6-29 days.................<15.0 mg/dL       Alkaline Phosphatase   Date Value Ref Range Status   02/26/2025 77 40 - 150 U/L Final     AST   Date Value Ref Range Status   02/26/2025 26 10 - 40 U/L Final     ALT   Date Value Ref Range Status   02/26/2025 9 (L) 10 - 44 U/L Final     Anion Gap   Date Value Ref Range Status   02/26/2025 7 (L) 8 - 16 mmol/L Final              Reviewed prior medical records including endoscopy history (see surgical history).     Objective:      Physical Exam  Constitutional:       General: She is not in acute distress.     Appearance: She is well-developed.   HENT:      Head: Normocephalic.      Right Ear: Hearing normal.      Left Ear: Hearing normal.      Nose: Nose normal.      Mouth/Throat:      Mouth: No oral lesions.      Pharynx: Uvula midline.   Eyes:      General: Lids are normal.      Conjunctiva/sclera: Conjunctivae normal.      Pupils: Pupils are equal, round, and reactive to light.   Neck:      Trachea: Trachea normal.   Cardiovascular:      Rate and Rhythm: Normal rate and regular rhythm.      Heart sounds: Normal heart sounds. No murmur heard.  Pulmonary:      Effort: Pulmonary effort is normal. No respiratory distress.      Breath sounds: Normal breath sounds. No stridor. No wheezing.   Abdominal:      General: Bowel sounds are normal. There is no distension.      Palpations: Abdomen is soft. There is no mass.      Tenderness: There is no abdominal tenderness. There is no guarding or rebound.   Musculoskeletal:         General: Normal range of motion.      Cervical back: Normal range of motion.   Skin:     General: Skin is warm and dry.      Findings: No rash.      Comments: Non jaundiced    Neurological:      Mental Status: She is alert and oriented to person, place, and time.   Psychiatric:         Speech: Speech normal.         Behavior: Behavior normal. Behavior is cooperative.           Assessment:       1. Epigastric pain    2. Belching    3. Gastroesophageal reflux disease without esophagitis    4. History of Helicobacter pylori infection           Plan:   All diagnoses and orders for this visit:    Epigastric pain, Belching, Gastroesophageal reflux disease without esophagitis & History of Helicobacter pylori infection  - US Abdomen Complete; Future; Expected date: 06/10/2025  - Schedule EGD   - Continue Protonix 40 mg once daily  - Take PPI in the morning 30 minutes before breakfast  - Recommend to avoid large meals, avoid eating within 3 hours of bedtime, elevate head of bed if nocturnal symptoms are present, smoking cessation (if current smoker), & weight loss (if overweight).   - Recommend minimize/avoid high-fat foods, chocolate, caffeine, citrus, alcohol, & tomato products.  - Advised to avoid/limit use of NSAID's, since they can cause GI upset, bleeding, and/or ulcers. If needed, take with food.      If no improvement in symptoms or symptoms worsen, call/follow-up at clinic or go to ER

## 2025-06-17 ENCOUNTER — RESULTS FOLLOW-UP (OUTPATIENT)
Dept: GASTROENTEROLOGY | Facility: CLINIC | Age: 20
End: 2025-06-17

## 2025-06-17 ENCOUNTER — TELEPHONE (OUTPATIENT)
Dept: GASTROENTEROLOGY | Facility: CLINIC | Age: 20
End: 2025-06-17
Payer: COMMERCIAL

## 2025-06-17 ENCOUNTER — HOSPITAL ENCOUNTER (OUTPATIENT)
Dept: RADIOLOGY | Facility: HOSPITAL | Age: 20
Discharge: HOME | End: 2025-06-17
Attending: NURSE PRACTITIONER
Payer: COMMERCIAL

## 2025-06-17 DIAGNOSIS — R93.422 ABNORMAL ULTRASOUND OF LEFT KIDNEY: ICD-10-CM

## 2025-06-17 DIAGNOSIS — R93.5 ABNORMAL US (ULTRASOUND) OF ABDOMEN: Primary | ICD-10-CM

## 2025-06-17 DIAGNOSIS — R10.13 EPIGASTRIC PAIN: ICD-10-CM

## 2025-06-17 PROCEDURE — 76700 US EXAM ABDOM COMPLETE: CPT | Mod: 26,,, | Performed by: RADIOLOGY

## 2025-06-17 PROCEDURE — 76700 US EXAM ABDOM COMPLETE: CPT | Mod: TC,PO

## 2025-06-18 NOTE — TELEPHONE ENCOUNTER
Let patient know her US of abdomen showed a mildly prominent calyx of the left kidney; otherwise, unremarkable. Recommend a renal ultrasound to further evaluate left kidney.

## 2025-06-18 NOTE — TELEPHONE ENCOUNTER
Spoke with pt. Informed of results & recommendations per Inocencia Davis NP. Pt verbalized understanding. .  Scheduled US.   Pt verbalized understanding to all.

## 2025-06-24 ENCOUNTER — HOSPITAL ENCOUNTER (OUTPATIENT)
Dept: RADIOLOGY | Facility: HOSPITAL | Age: 20
Discharge: HOME | End: 2025-06-24
Attending: NURSE PRACTITIONER
Payer: COMMERCIAL

## 2025-06-24 ENCOUNTER — RESULTS FOLLOW-UP (OUTPATIENT)
Dept: GASTROENTEROLOGY | Facility: CLINIC | Age: 20
End: 2025-06-24

## 2025-06-24 DIAGNOSIS — R93.422 ABNORMAL ULTRASOUND OF LEFT KIDNEY: ICD-10-CM

## 2025-06-24 PROCEDURE — 76770 US EXAM ABDO BACK WALL COMP: CPT | Mod: TC,PO

## 2025-06-24 PROCEDURE — 76770 US EXAM ABDO BACK WALL COMP: CPT | Mod: 26,,, | Performed by: RADIOLOGY

## 2025-07-15 ENCOUNTER — ANESTHESIA (OUTPATIENT)
Dept: ENDOSCOPY | Facility: HOSPITAL | Age: 20
End: 2025-07-15
Payer: COMMERCIAL

## 2025-07-15 ENCOUNTER — ANESTHESIA EVENT (OUTPATIENT)
Dept: ENDOSCOPY | Facility: HOSPITAL | Age: 20
End: 2025-07-15
Payer: COMMERCIAL

## 2025-07-15 ENCOUNTER — HOSPITAL ENCOUNTER (OUTPATIENT)
Facility: HOSPITAL | Age: 20
Discharge: HOME | End: 2025-07-15
Attending: INTERNAL MEDICINE | Admitting: INTERNAL MEDICINE
Payer: COMMERCIAL

## 2025-07-15 DIAGNOSIS — K21.9 GASTROESOPHAGEAL REFLUX DISEASE, UNSPECIFIED WHETHER ESOPHAGITIS PRESENT: ICD-10-CM

## 2025-07-15 DIAGNOSIS — Z86.19 HISTORY OF HELICOBACTER PYLORI INFECTION: ICD-10-CM

## 2025-07-15 DIAGNOSIS — R10.13 EPIGASTRIC PAIN: ICD-10-CM

## 2025-07-15 LAB
B-HCG UR QL: NEGATIVE
CTP QC/QA: YES

## 2025-07-15 PROCEDURE — 88342 IMHCHEM/IMCYTCHM 1ST ANTB: CPT | Mod: 26,,, | Performed by: PATHOLOGY

## 2025-07-15 PROCEDURE — 37000008 HC ANESTHESIA 1ST 15 MINUTES: Mod: PO | Performed by: INTERNAL MEDICINE

## 2025-07-15 PROCEDURE — 88305 TISSUE EXAM BY PATHOLOGIST: CPT | Mod: 26,,, | Performed by: PATHOLOGY

## 2025-07-15 PROCEDURE — 43239 EGD BIOPSY SINGLE/MULTIPLE: CPT | Mod: ,,, | Performed by: INTERNAL MEDICINE

## 2025-07-15 PROCEDURE — 63600175 PHARM REV CODE 636 W HCPCS: Mod: PO | Performed by: NURSE ANESTHETIST, CERTIFIED REGISTERED

## 2025-07-15 PROCEDURE — 88305 TISSUE EXAM BY PATHOLOGIST: CPT | Mod: TC | Performed by: INTERNAL MEDICINE

## 2025-07-15 PROCEDURE — 27201012 HC FORCEPS, HOT/COLD, DISP: Mod: PO | Performed by: INTERNAL MEDICINE

## 2025-07-15 PROCEDURE — 43239 EGD BIOPSY SINGLE/MULTIPLE: CPT | Mod: PO | Performed by: INTERNAL MEDICINE

## 2025-07-15 PROCEDURE — 81025 URINE PREGNANCY TEST: CPT | Mod: PO | Performed by: INTERNAL MEDICINE

## 2025-07-15 PROCEDURE — 63600175 PHARM REV CODE 636 W HCPCS: Mod: PO | Performed by: INTERNAL MEDICINE

## 2025-07-15 RX ORDER — SODIUM CHLORIDE 0.9 % (FLUSH) 0.9 %
10 SYRINGE (ML) INJECTION
Status: DISCONTINUED | OUTPATIENT
Start: 2025-07-15 | End: 2025-07-15 | Stop reason: HOSPADM

## 2025-07-15 RX ORDER — PROPOFOL 10 MG/ML
VIAL (ML) INTRAVENOUS
Status: DISCONTINUED | OUTPATIENT
Start: 2025-07-15 | End: 2025-07-15

## 2025-07-15 RX ORDER — LIDOCAINE HYDROCHLORIDE 20 MG/ML
INJECTION INTRAVENOUS
Status: DISCONTINUED | OUTPATIENT
Start: 2025-07-15 | End: 2025-07-15

## 2025-07-15 RX ORDER — SODIUM CHLORIDE, SODIUM LACTATE, POTASSIUM CHLORIDE, CALCIUM CHLORIDE 600; 310; 30; 20 MG/100ML; MG/100ML; MG/100ML; MG/100ML
INJECTION, SOLUTION INTRAVENOUS CONTINUOUS
Status: DISCONTINUED | OUTPATIENT
Start: 2025-07-15 | End: 2025-07-15 | Stop reason: HOSPADM

## 2025-07-15 RX ADMIN — SODIUM CHLORIDE, SODIUM LACTATE, POTASSIUM CHLORIDE, AND CALCIUM CHLORIDE: .6; .31; .03; .02 INJECTION, SOLUTION INTRAVENOUS at 10:07

## 2025-07-15 RX ADMIN — PROPOFOL 30 MG: 10 INJECTION, EMULSION INTRAVENOUS at 10:07

## 2025-07-15 RX ADMIN — SODIUM CHLORIDE, POTASSIUM CHLORIDE, SODIUM LACTATE AND CALCIUM CHLORIDE: 600; 310; 30; 20 INJECTION, SOLUTION INTRAVENOUS at 09:07

## 2025-07-15 RX ADMIN — LIDOCAINE HYDROCHLORIDE 100 MG: 20 INJECTION INTRAVENOUS at 10:07

## 2025-07-15 RX ADMIN — PROPOFOL 100 MG: 10 INJECTION, EMULSION INTRAVENOUS at 10:07

## 2025-07-15 NOTE — DISCHARGE SUMMARY
[place] - Endoscopy  Discharge Note  Short Stay  Discharge Note  Short Stay      SUMMARY     Admit Date: 7/15/2025    Attending Physician: Vickey Malin MD     Discharge Physician: Vickey Malin MD    Discharge Date: 7/15/2025 10:42 AM    Final Diagnosis: Epigastric pain [R10.13]  Gastroesophageal reflux disease, unspecified whether esophagitis present [K21.9]  History of Helicobacter pylori infection [Z86.19]    Disposition: HOME OR SELF CARE    Patient Instructions:   Current Discharge Medication List        CONTINUE these medications which have NOT CHANGED    Details   ergocalciferol (ERGOCALCIFEROL) 50,000 unit Cap Take 1 capsule (50,000 Units total) by mouth every 7 days.  Qty: 12 capsule, Refills: 3    Associated Diagnoses: Vitamin D deficiency      pantoprazole (PROTONIX) 40 MG tablet Take 1 tablet (40 mg total) by mouth once daily.  Qty: 30 tablet, Refills: 1    Associated Diagnoses: Gastric reflux             Discharge Procedure Orders (must include Diet, Follow-up, Activity)    Follow Up:  Follow up with PCP as previously scheduled  Resume routine diet.  Activity as tolerated.    No driving day of procedure.

## 2025-07-15 NOTE — PROVATION PATIENT INSTRUCTIONS
Discharge Summary/Instructions after an Endoscopic Procedure  Patient Name: Antionette Menon  Patient MRN: [id number]  Patient YOB: 2005  Tuesday, July 15, 2025  Vickey Malin MD  Dear patient,  As a result of recent federal legislation (The Federal Cures Act), you may   receive lab or pathology results from your procedure in your [facility]   account before your physician is able to contact you. Your physician or   their representative will relay the results to you with their   recommendations at their soonest availability.  Thank you,  RESTRICTIONS:  During your procedure today, you received medications for sedation.  These   medications may affect your judgment, balance and coordination.  Therefore,   for 24 hours, you have the following restrictions:   - DO NOT drive a car, operate machinery, make legal/financial decisions,   sign important papers or drink alcohol.    ACTIVITY:  Today: no heavy lifting, straining or running due to procedural   sedation/anesthesia.  The following day: return to full activity including work.  DIET:  Eat and drink normally unless instructed otherwise.     TREATMENT FOR COMMON SIDE EFFECTS:  - Mild abdominal pain, nausea, belching, bloating or excessive gas:  rest,   eat lightly and use a heating pad.  - Sore Throat: treat with throat lozenges and/or gargle with warm salt   water.  - Because air was used during the procedure, expelling large amounts of air   from your rectum or belching is normal.  - If a bowel prep was taken, you may not have a bowel movement for 1-3 days.    This is normal.  SYMPTOMS TO WATCH FOR AND REPORT TO YOUR PHYSICIAN:  1. Abdominal pain or bloating, other than gas cramps.  2. Chest pain.  3. Back pain.  4. Signs of infection such as: chills or fever occurring within 24 hours   after the procedure.  5. Rectal bleeding, which would show as bright red, maroon, or black stools.   (A tablespoon of blood from the rectum is not serious, especially  if   hemorrhoids are present.)  6. Vomiting.  7. Weakness or dizziness.  GO DIRECTLY TO THE NEAREST EMERGENCY ROOM IF YOU HAVE ANY OF THE FOLLOWING:      Difficulty breathing              Chills and/or fever over 101 F   Persistent vomiting and/or vomiting blood   Severe abdominal pain   Severe chest pain   Black, tarry stools   Bleeding- more than one tablespoon   Any other symptom or condition that you feel may need urgent attention  Your doctor recommends these additional instructions:  If any biopsies were taken, your doctors clinic will contact you in 1 to 2   weeks with any results.  We are waiting for your pathology results.   Continue your present medications.   You are being discharged to home.  For questions, problems or results please call your physician - Vickey Malin MD at Work:  [phone].  EMERGENCY PHONE NUMBER: [phone], LAB RESULTS: [phone]  IF A COMPLICATION OR EMERGENCY SITUATION ARISES AND YOU ARE UNABLE TO REACH   YOUR PHYSICIAN - GO DIRECTLY TO THE EMERGENCY ROOM.  ___________________________________________  Nurse Signature  ___________________________________________  Patient/Designated Responsible Party Signature  Vickey Malin MD  7/15/2025 10:41:44 AM  This report has been verified and signed electronically.  Dear patient,  As a result of recent federal legislation (The Federal Cures Act), you may   receive lab or pathology results from your procedure in your [facility]   account before your physician is able to contact you. Your physician or   their representative will relay the results to you with their   recommendations at their soonest availability.  Thank you.  PROVATION

## 2025-07-15 NOTE — H&P
History & Physical - Short Stay  Gastroenterology      SUBJECTIVE:     Procedure: EGD    Chief Complaint/Indication for Procedure: Epigastric Pain and Reflux    PTA Medications   Medication Sig    ergocalciferol (ERGOCALCIFEROL) 50,000 unit Cap Take 1 capsule (50,000 Units total) by mouth every 7 days.    pantoprazole (PROTONIX) 40 MG tablet Take 1 tablet (40 mg total) by mouth once daily.       Review of patient's allergies indicates:  No Known Allergies     Past Medical History:   Diagnosis Date    H. pylori infection      Past Surgical History:   Procedure Laterality Date    EGD - EXTERNAL RESULT      UPPER GASTROINTESTINAL ENDOSCOPY       No family history on file.  Social History[1]      OBJECTIVE:     Vital Signs (Most Recent)       Physical Exam:                                                       GENERAL:  Comfortable, in no acute distress.                                 HEENT EXAM:  Nonicteric.  No adenopathy.  Oropharynx is clear.               NECK:  Supple.                                                               LUNGS:  Clear.                                                               CARDIAC:  Regular rate and rhythm.  S1, S2.  No murmur.                      ABDOMEN:  Soft, positive bowel sounds, nontender.  No hepatosplenomegaly or masses.  No rebound or guarding.                                             EXTREMITIES:  No edema.     MENTAL STATUS:  Normal, alert and oriented.      ASSESSMENT/PLAN:     Assessment: Epigastric Pain and Reflux    Plan: EGD    Anesthesia Plan: General    ASA Grade: ASA 2 - Patient with mild systemic disease with no functional limitations    MALLAMPATI SCORE:  I (soft palate, uvula, fauces, and tonsillar pillars visible)           [1]   Social History  Tobacco Use    Smoking status: Never    Smokeless tobacco: Never   Substance Use Topics    Alcohol use: Never    Drug use: Never

## 2025-07-15 NOTE — TRANSFER OF CARE
"Anesthesia Transfer of Care Note    Patient: Antionette Menon    Procedure(s) Performed: Procedure(s) (LRB):  EGD (ESOPHAGOGASTRODUODENOSCOPY) (N/A)    Patient location: GI    Anesthesia Type: general    Transport from OR: Transported from OR on room air with adequate spontaneous ventilation    Post pain: adequate analgesia    Post assessment: no apparent anesthetic complications    Post vital signs: stable    Level of consciousness: awake and alert    Nausea/Vomiting: no nausea/vomiting    Complications: none    Transfer of care protocol was followed      Last vitals: Visit Vitals  /74 (BP Location: Right arm, Patient Position: Lying)   Pulse 82   Temp 36.5 °C (97.7 °F) (Skin)   Resp 16   Ht 5' 2" (1.575 m)   Wt 55.8 kg (123 lb)   LMP 06/28/2025   SpO2 100%   Breastfeeding No   BMI 22.50 kg/m²     "

## 2025-07-15 NOTE — ANESTHESIA POSTPROCEDURE EVALUATION
Anesthesia Post Evaluation    Patient: Antionette Menon    Procedure(s) Performed: Procedure(s) (LRB):  EGD (ESOPHAGOGASTRODUODENOSCOPY) (N/A)    Final Anesthesia Type: general      Patient location during evaluation: PACU  Patient participation: Yes- Able to Participate  Level of consciousness: sedated and awake  Post-procedure vital signs: reviewed and stable  Pain management: adequate  Airway patency: patent    PONV status at discharge: No PONV  Anesthetic complications: no      Cardiovascular status: hypertensive, blood pressure returned to baseline and hemodynamically stable  Respiratory status: spontaneous ventilation  Hydration status: euvolemic  Follow-up not needed.              Vitals Value Taken Time   /81 07/15/25 10:59   Temp 36.3 °C (97.4 °F) 07/15/25 10:40   Pulse 83 07/15/25 10:59   Resp 17 07/15/25 10:59   SpO2 96 % 07/15/25 10:59         No case tracking events are documented in the log.      Pain/Fer Score: Fer Score: 6 (7/15/2025 10:45 AM)

## 2025-07-15 NOTE — ANESTHESIA PREPROCEDURE EVALUATION
07/15/2025  Antionette Menon is a 19 y.o., female.      Pre-op Assessment    I have reviewed the Patient Summary Reports.     I have reviewed the Nursing Notes. I have reviewed the NPO Status.   I have reviewed the Medications.     Review of Systems  Cardiovascular:  Cardiovascular Normal                                              Pulmonary:  Pulmonary Normal                       Renal/:  Renal/ Normal                 Hepatic/GI:     GERD                Neurological:  Neurology Normal                                      Endocrine:  Endocrine Normal                Physical Exam  General: Well nourished    Airway:  Mallampati: II   Neck ROM: Normal ROM    Dental:  Intact, Braces        Anesthesia Plan  Type of Anesthesia, risks & benefits discussed:    Anesthesia Type: Gen Natural Airway  Intra-op Monitoring Plan: Standard ASA Monitors  Induction:  IV  Informed Consent: Informed consent signed with the Patient and all parties understand the risks and agree with anesthesia plan.  All questions answered.   ASA Score: 2    Ready For Surgery From Anesthesia Perspective.     .

## 2025-07-16 VITALS
SYSTOLIC BLOOD PRESSURE: 123 MMHG | HEIGHT: 62 IN | HEART RATE: 83 BPM | BODY MASS INDEX: 22.63 KG/M2 | RESPIRATION RATE: 17 BRPM | OXYGEN SATURATION: 96 % | DIASTOLIC BLOOD PRESSURE: 81 MMHG | TEMPERATURE: 97 F | WEIGHT: 123 LBS

## 2025-07-17 DIAGNOSIS — K21.9 GASTRIC REFLUX: ICD-10-CM

## 2025-07-21 RX ORDER — PANTOPRAZOLE SODIUM 40 MG/1
40 TABLET, DELAYED RELEASE ORAL DAILY
Qty: 30 TABLET | Refills: 1 | Status: SHIPPED | OUTPATIENT
Start: 2025-07-21 | End: 2026-07-21

## 2025-07-24 LAB
ESTRIOL SERPL-MCNC: NORMAL NG/ML
ESTROGEN SERPL-MCNC: NORMAL PG/ML
INSULIN SERPL-ACNC: NORMAL U[IU]/ML
LAB AP CLINICAL INFORMATION: NORMAL
LAB AP GROSS DESCRIPTION: NORMAL
LAB AP PERFORMING LOCATION(S): NORMAL
LAB AP REPORT FOOTNOTES: NORMAL

## 2025-08-15 DIAGNOSIS — K21.9 GASTRIC REFLUX: ICD-10-CM

## 2025-08-15 RX ORDER — PANTOPRAZOLE SODIUM 40 MG/1
40 TABLET, DELAYED RELEASE ORAL
Qty: 90 TABLET | Refills: 1 | Status: SHIPPED | OUTPATIENT
Start: 2025-08-15